# Patient Record
Sex: FEMALE | Race: OTHER
[De-identification: names, ages, dates, MRNs, and addresses within clinical notes are randomized per-mention and may not be internally consistent; named-entity substitution may affect disease eponyms.]

---

## 2020-11-23 ENCOUNTER — HOSPITAL ENCOUNTER (OUTPATIENT)
Dept: HOSPITAL 94 - CARD DIAG | Age: 85
Discharge: HOME | End: 2020-11-23
Attending: INTERNAL MEDICINE
Payer: MEDICARE

## 2020-11-23 DIAGNOSIS — I10: ICD-10-CM

## 2020-11-23 DIAGNOSIS — Z95.2: ICD-10-CM

## 2020-11-23 DIAGNOSIS — Z09: Primary | ICD-10-CM

## 2020-11-23 DIAGNOSIS — I34.0: ICD-10-CM

## 2020-11-23 PROCEDURE — 93005 ELECTROCARDIOGRAM TRACING: CPT

## 2020-11-23 PROCEDURE — 93306 TTE W/DOPPLER COMPLETE: CPT

## 2022-03-15 ENCOUNTER — HOSPITAL ENCOUNTER (EMERGENCY)
Dept: HOSPITAL 94 - ER | Age: 87
Discharge: HOME | End: 2022-03-15
Payer: COMMERCIAL

## 2022-03-15 VITALS — SYSTOLIC BLOOD PRESSURE: 149 MMHG | DIASTOLIC BLOOD PRESSURE: 69 MMHG

## 2022-03-15 VITALS — BODY MASS INDEX: 29.43 KG/M2 | HEIGHT: 60 IN | WEIGHT: 149.91 LBS

## 2022-03-15 DIAGNOSIS — R42: ICD-10-CM

## 2022-03-15 DIAGNOSIS — Z79.82: ICD-10-CM

## 2022-03-15 DIAGNOSIS — I10: Primary | ICD-10-CM

## 2022-03-15 DIAGNOSIS — Z79.899: ICD-10-CM

## 2022-03-15 DIAGNOSIS — Z86.2: ICD-10-CM

## 2022-03-15 DIAGNOSIS — Z98.890: ICD-10-CM

## 2022-03-15 LAB
ALBUMIN SERPL BCP-MCNC: 3.6 G/DL (ref 3.4–5)
ALBUMIN/GLOB SERPL: 1.2 {RATIO} (ref 1.1–1.5)
ALP SERPL-CCNC: 108 IU/L (ref 46–116)
ALT SERPL W P-5'-P-CCNC: 12 U/L (ref 12–78)
ANION GAP SERPL CALCULATED.3IONS-SCNC: 9 MMOL/L (ref 8–16)
AST SERPL W P-5'-P-CCNC: 21 U/L (ref 10–37)
BASOPHILS # BLD AUTO: 0 X10'3 (ref 0–0.2)
BASOPHILS NFR BLD AUTO: 0.4 % (ref 0–1)
BILIRUB SERPL-MCNC: 0.5 MG/DL (ref 0.1–1)
BUN SERPL-MCNC: 12 MG/DL (ref 7–18)
BUN/CREAT SERPL: 15.2 (ref 6.6–38)
CALCIUM SERPL-MCNC: 9.3 MG/DL (ref 8.5–10.1)
CHLORIDE SERPL-SCNC: 98 MMOL/L (ref 99–107)
CO2 SERPL-SCNC: 24.2 MMOL/L (ref 24–32)
CREAT SERPL-MCNC: 0.79 MG/DL (ref 0.4–0.9)
EOSINOPHIL # BLD AUTO: 0 X10'3 (ref 0–0.9)
EOSINOPHIL NFR BLD AUTO: 0 % (ref 0–6)
ERYTHROCYTE [DISTWIDTH] IN BLOOD BY AUTOMATED COUNT: 14.3 % (ref 11.5–14.5)
GFR SERPL CREATININE-BSD FRML MDRD: 69 ML/MIN
GLUCOSE SERPL-MCNC: 108 MG/DL (ref 70–104)
HCT VFR BLD AUTO: 34.1 % (ref 35–45)
HGB BLD-MCNC: 11.6 G/DL (ref 12–16)
LYMPHOCYTES # BLD AUTO: 0.6 X10'3 (ref 1.1–4.8)
LYMPHOCYTES NFR BLD AUTO: 14.2 % (ref 21–51)
MCH RBC QN AUTO: 31.2 PG (ref 27–31)
MCHC RBC AUTO-ENTMCNC: 34 G/DL (ref 33–36.5)
MCV RBC AUTO: 91.6 FL (ref 78–98)
MONOCYTES # BLD AUTO: 0.3 X10'3 (ref 0–0.9)
MONOCYTES NFR BLD AUTO: 7.4 % (ref 2–12)
NEUTROPHILS # BLD AUTO: 3.3 X10'3 (ref 1.8–7.7)
NEUTROPHILS NFR BLD AUTO: 78 % (ref 42–75)
PLATELET # BLD AUTO: 152 X10'3 (ref 140–440)
PMV BLD AUTO: 7.5 FL (ref 7.4–10.4)
POTASSIUM SERPL-SCNC: 3.9 MMOL/L (ref 3.5–5.1)
PROT SERPL-MCNC: 6.6 G/DL (ref 6.4–8.2)
RBC # BLD AUTO: 3.73 X10'6 (ref 4.2–5.6)
SODIUM SERPL-SCNC: 131 MMOL/L (ref 135–145)
WBC # BLD AUTO: 4.3 X10'3 (ref 4.5–11)

## 2022-03-15 PROCEDURE — 83880 ASSAY OF NATRIURETIC PEPTIDE: CPT

## 2022-03-15 PROCEDURE — 36415 COLL VENOUS BLD VENIPUNCTURE: CPT

## 2022-03-15 PROCEDURE — 84484 ASSAY OF TROPONIN QUANT: CPT

## 2022-03-15 PROCEDURE — 71045 X-RAY EXAM CHEST 1 VIEW: CPT

## 2022-03-15 PROCEDURE — 99285 EMERGENCY DEPT VISIT HI MDM: CPT

## 2022-03-15 PROCEDURE — 93005 ELECTROCARDIOGRAM TRACING: CPT

## 2022-03-15 PROCEDURE — 80053 COMPREHEN METABOLIC PANEL: CPT

## 2022-03-15 PROCEDURE — 85025 COMPLETE CBC W/AUTO DIFF WBC: CPT

## 2022-04-11 ENCOUNTER — HOSPITAL ENCOUNTER (EMERGENCY)
Dept: HOSPITAL 94 - ER | Age: 87
Discharge: HOME | End: 2022-04-11
Payer: COMMERCIAL

## 2022-04-11 VITALS — BODY MASS INDEX: 29.83 KG/M2 | WEIGHT: 168.34 LBS | HEIGHT: 63 IN

## 2022-04-11 VITALS — DIASTOLIC BLOOD PRESSURE: 78 MMHG | SYSTOLIC BLOOD PRESSURE: 151 MMHG

## 2022-04-11 DIAGNOSIS — Z79.82: ICD-10-CM

## 2022-04-11 DIAGNOSIS — Z79.899: ICD-10-CM

## 2022-04-11 DIAGNOSIS — Z86.2: ICD-10-CM

## 2022-04-11 DIAGNOSIS — R42: ICD-10-CM

## 2022-04-11 DIAGNOSIS — R33.9: ICD-10-CM

## 2022-04-11 DIAGNOSIS — K59.00: ICD-10-CM

## 2022-04-11 DIAGNOSIS — I10: ICD-10-CM

## 2022-04-11 DIAGNOSIS — I51.7: Primary | ICD-10-CM

## 2022-04-11 LAB
ALBUMIN SERPL BCP-MCNC: 3.5 G/DL (ref 3.4–5)
ALBUMIN/GLOB SERPL: 1.2 {RATIO} (ref 1.1–1.5)
ALP SERPL-CCNC: 120 IU/L (ref 46–116)
ALT SERPL W P-5'-P-CCNC: 14 U/L (ref 12–78)
ANION GAP SERPL CALCULATED.3IONS-SCNC: 6 MMOL/L (ref 8–16)
AST SERPL W P-5'-P-CCNC: 22 U/L (ref 10–37)
BACTERIA URNS QL MICRO: (no result) /HPF
BASOPHILS # BLD AUTO: 0 X10'3 (ref 0–0.2)
BASOPHILS NFR BLD AUTO: 0.7 % (ref 0–1)
BILIRUB SERPL-MCNC: 0.4 MG/DL (ref 0.1–1)
BUN SERPL-MCNC: 13 MG/DL (ref 7–18)
BUN/CREAT SERPL: 14.3 (ref 6.6–38)
CALCIUM SERPL-MCNC: 9.2 MG/DL (ref 8.5–10.1)
CHLORIDE SERPL-SCNC: 102 MMOL/L (ref 99–107)
CLARITY UR: CLEAR
CO2 SERPL-SCNC: 25.8 MMOL/L (ref 24–32)
COLOR UR: YELLOW
CREAT SERPL-MCNC: 0.91 MG/DL (ref 0.4–0.9)
DEPRECATED SQUAMOUS URNS QL MICRO: (no result) /LPF
EOSINOPHIL # BLD AUTO: 0 X10'3 (ref 0–0.9)
EOSINOPHIL NFR BLD AUTO: 0.1 % (ref 0–6)
ERYTHROCYTE [DISTWIDTH] IN BLOOD BY AUTOMATED COUNT: 14 % (ref 11.5–14.5)
GFR SERPL CREATININE-BSD FRML MDRD: 59 ML/MIN
GLUCOSE SERPL-MCNC: 107 MG/DL (ref 70–104)
GLUCOSE UR STRIP-MCNC: NEGATIVE MG/DL
HCT VFR BLD AUTO: 33.1 % (ref 35–45)
HGB BLD-MCNC: 11.4 G/DL (ref 12–16)
HGB UR QL STRIP: (no result)
KETONES UR STRIP-MCNC: NEGATIVE MG/DL
LEUKOCYTE ESTERASE UR QL STRIP: NEGATIVE
LIPASE SERPL-CCNC: < 50 U/L (ref 73–393)
LYMPHOCYTES # BLD AUTO: 0.6 X10'3 (ref 1.1–4.8)
LYMPHOCYTES NFR BLD AUTO: 14.5 % (ref 21–51)
MCH RBC QN AUTO: 31.3 PG (ref 27–31)
MCHC RBC AUTO-ENTMCNC: 34.4 G/DL (ref 33–36.5)
MCV RBC AUTO: 91.1 FL (ref 78–98)
MONOCYTES # BLD AUTO: 0.4 X10'3 (ref 0–0.9)
MONOCYTES NFR BLD AUTO: 8.5 % (ref 2–12)
MUCOUS THREADS URNS QL MICRO: (no result) /LPF
NEUTROPHILS # BLD AUTO: 3.2 X10'3 (ref 1.8–7.7)
NEUTROPHILS NFR BLD AUTO: 76.2 % (ref 42–75)
NITRITE UR QL STRIP: NEGATIVE
PH UR STRIP: 6.5 [PH] (ref 4.8–8)
PLATELET # BLD AUTO: 159 X10'3 (ref 140–440)
PMV BLD AUTO: 7 FL (ref 7.4–10.4)
POTASSIUM SERPL-SCNC: 3.6 MMOL/L (ref 3.5–5.1)
PROT SERPL-MCNC: 6.5 G/DL (ref 6.4–8.2)
PROT UR QL STRIP: NEGATIVE MG/DL
RBC # BLD AUTO: 3.64 X10'6 (ref 4.2–5.6)
SODIUM SERPL-SCNC: 134 MMOL/L (ref 135–145)
SP GR UR STRIP: 1.01 (ref 1–1.03)
URN COLLECT METHOD CLASS: (no result)
UROBILINOGEN UR STRIP-MCNC: 0.2 E.U/DL (ref 0.2–1)
WBC # BLD AUTO: 4.2 X10'3 (ref 4.5–11)
WBC #/AREA URNS HPF: (no result) /HPF (ref 0–4)

## 2022-04-11 PROCEDURE — 93005 ELECTROCARDIOGRAM TRACING: CPT

## 2022-04-11 PROCEDURE — 96360 HYDRATION IV INFUSION INIT: CPT

## 2022-04-11 PROCEDURE — 36415 COLL VENOUS BLD VENIPUNCTURE: CPT

## 2022-04-11 PROCEDURE — 83690 ASSAY OF LIPASE: CPT

## 2022-04-11 PROCEDURE — 80053 COMPREHEN METABOLIC PANEL: CPT

## 2022-04-11 PROCEDURE — 71045 X-RAY EXAM CHEST 1 VIEW: CPT

## 2022-04-11 PROCEDURE — 81001 URINALYSIS AUTO W/SCOPE: CPT

## 2022-04-11 PROCEDURE — 99285 EMERGENCY DEPT VISIT HI MDM: CPT

## 2022-04-11 PROCEDURE — 85025 COMPLETE CBC W/AUTO DIFF WBC: CPT

## 2022-04-11 PROCEDURE — 84484 ASSAY OF TROPONIN QUANT: CPT

## 2022-04-11 NOTE — NUR
Pt and care giver given and understands d/c instructions.  Ambulatory with a 
walker.  IV d/c'd, catheter was intact.

## 2024-12-10 ENCOUNTER — HOSPITAL ENCOUNTER (INPATIENT)
Dept: HOSPITAL 94 - ER | Age: 89
LOS: 2 days | Discharge: HOME HEALTH | DRG: 391 | End: 2024-12-12
Attending: HOSPITALIST | Admitting: SURGERY
Payer: COMMERCIAL

## 2024-12-10 VITALS — WEIGHT: 158.73 LBS | BODY MASS INDEX: 33.32 KG/M2 | HEIGHT: 58 IN

## 2024-12-10 DIAGNOSIS — I12.9: ICD-10-CM

## 2024-12-10 DIAGNOSIS — I95.9: ICD-10-CM

## 2024-12-10 DIAGNOSIS — N18.2: ICD-10-CM

## 2024-12-10 DIAGNOSIS — D64.9: ICD-10-CM

## 2024-12-10 DIAGNOSIS — J44.9: ICD-10-CM

## 2024-12-10 DIAGNOSIS — Z79.82: ICD-10-CM

## 2024-12-10 DIAGNOSIS — E87.1: ICD-10-CM

## 2024-12-10 DIAGNOSIS — I10: ICD-10-CM

## 2024-12-10 DIAGNOSIS — N17.0: ICD-10-CM

## 2024-12-10 DIAGNOSIS — A05.9: Primary | ICD-10-CM

## 2024-12-10 DIAGNOSIS — J44.89: ICD-10-CM

## 2024-12-10 DIAGNOSIS — Z66: ICD-10-CM

## 2024-12-10 DIAGNOSIS — E03.9: ICD-10-CM

## 2024-12-10 DIAGNOSIS — R55: ICD-10-CM

## 2024-12-10 DIAGNOSIS — Z79.899: ICD-10-CM

## 2024-12-10 DIAGNOSIS — E86.0: ICD-10-CM

## 2024-12-10 LAB
ALBUMIN SERPL BCP-MCNC: 3 G/DL (ref 3.4–5)
ALBUMIN/GLOB SERPL: 1 {RATIO} (ref 1.1–1.5)
ALP SERPL-CCNC: 91 IU/L (ref 46–116)
ALT SERPL W P-5'-P-CCNC: 11 U/L (ref 12–78)
ANION GAP SERPL CALCULATED.3IONS-SCNC: 10 MMOL/L (ref 8–16)
AST SERPL W P-5'-P-CCNC: 17 U/L (ref 10–37)
BASOPHILS # BLD AUTO: 0 X10'3 (ref 0–0.2)
BASOPHILS NFR BLD AUTO: 0.3 % (ref 0–1)
BILIRUB SERPL-MCNC: 0.5 MG/DL (ref 0.1–1)
BUN SERPL-MCNC: 16 MG/DL (ref 7–18)
BUN/CREAT SERPL: 10.4 (ref 10–20)
CALCIUM SERPL-MCNC: 8.5 MG/DL (ref 8.5–10.1)
CHLORIDE SERPL-SCNC: 101 MMOL/L (ref 99–107)
CO2 SERPL-SCNC: 21.7 MMOL/L (ref 24–32)
CREAT CL PREDICTED SERPL C-G-VRATE: 17 ML/MIN
CREAT SERPL-MCNC: 1.54 MG/DL (ref 0.4–0.9)
EOSINOPHIL # BLD AUTO: 0 X10'3 (ref 0–0.9)
EOSINOPHIL NFR BLD AUTO: 0 % (ref 0–6)
ERYTHROCYTE [DISTWIDTH] IN BLOOD BY AUTOMATED COUNT: 14.7 % (ref 11.5–14.5)
GFR SERPL CREATININE-BSD FRML MDRD: 32 ML/MIN
GLOBULIN SER CALC-MCNC: 3.1 G/DL (ref 2.7–4.3)
GLUCOSE SERPL-MCNC: 143 MG/DL (ref 70–104)
HCT VFR BLD AUTO: 34.4 % (ref 35–45)
HGB BLD-MCNC: 11.6 G/DL (ref 12–16)
LYMPHOCYTES # BLD AUTO: 0.7 X10'3 (ref 1.1–4.8)
LYMPHOCYTES NFR BLD AUTO: 13 % (ref 21–51)
MCH RBC QN AUTO: 30.4 PG (ref 27–31)
MCHC RBC AUTO-ENTMCNC: 33.7 G/DL (ref 33–36.5)
MCV RBC AUTO: 90.2 FL (ref 78–98)
MONOCYTES # BLD AUTO: 0.3 X10'3 (ref 0–0.9)
MONOCYTES NFR BLD AUTO: 5.3 % (ref 2–12)
NEUTROPHILS # BLD AUTO: 4.5 X10'3 (ref 1.8–7.7)
NEUTROPHILS NFR BLD AUTO: 81.4 % (ref 42–75)
NT-PROBNP SERPL-MCNC: 2342 PG/ML (ref 0–450)
PLATELET # BLD AUTO: 179 X10'3 (ref 140–440)
PMV BLD AUTO: 7.5 FL (ref 7.4–10.4)
POTASSIUM SERPL-SCNC: 4.3 MMOL/L (ref 3.5–5.1)
PROT SERPL-MCNC: 6.1 G/DL (ref 6.4–8.2)
RBC # BLD AUTO: 3.81 X10'6 (ref 4.2–5.6)
SODIUM SERPL-SCNC: 133 MMOL/L (ref 135–145)
WBC # BLD AUTO: 5.6 X10'3 (ref 4.5–11)

## 2024-12-10 PROCEDURE — 99285 EMERGENCY DEPT VISIT HI MDM: CPT

## 2024-12-10 PROCEDURE — 94760 N-INVAS EAR/PLS OXIMETRY 1: CPT

## 2024-12-10 PROCEDURE — 84443 ASSAY THYROID STIM HORMONE: CPT

## 2024-12-10 PROCEDURE — 97161 PT EVAL LOW COMPLEX 20 MIN: CPT

## 2024-12-10 PROCEDURE — 71045 X-RAY EXAM CHEST 1 VIEW: CPT

## 2024-12-10 PROCEDURE — 36415 COLL VENOUS BLD VENIPUNCTURE: CPT

## 2024-12-10 PROCEDURE — 87081 CULTURE SCREEN ONLY: CPT

## 2024-12-10 PROCEDURE — 93005 ELECTROCARDIOGRAM TRACING: CPT

## 2024-12-10 PROCEDURE — 93306 TTE W/DOPPLER COMPLETE: CPT

## 2024-12-10 PROCEDURE — 80053 COMPREHEN METABOLIC PANEL: CPT

## 2024-12-10 PROCEDURE — 84484 ASSAY OF TROPONIN QUANT: CPT

## 2024-12-10 PROCEDURE — 97535 SELF CARE MNGMENT TRAINING: CPT

## 2024-12-10 PROCEDURE — 83605 ASSAY OF LACTIC ACID: CPT

## 2024-12-10 PROCEDURE — 84145 PROCALCITONIN (PCT): CPT

## 2024-12-10 PROCEDURE — 83880 ASSAY OF NATRIURETIC PEPTIDE: CPT

## 2024-12-10 PROCEDURE — 85025 COMPLETE CBC W/AUTO DIFF WBC: CPT

## 2024-12-10 PROCEDURE — 97530 THERAPEUTIC ACTIVITIES: CPT

## 2024-12-10 PROCEDURE — 87040 BLOOD CULTURE FOR BACTERIA: CPT

## 2024-12-10 PROCEDURE — 83735 ASSAY OF MAGNESIUM: CPT

## 2024-12-10 PROCEDURE — 84100 ASSAY OF PHOSPHORUS: CPT

## 2024-12-10 PROCEDURE — 81001 URINALYSIS AUTO W/SCOPE: CPT

## 2024-12-10 RX ADMIN — SODIUM CHLORIDE ONE MLS/HR: 9 INJECTION INTRAMUSCULAR; INTRAVENOUS; SUBCUTANEOUS at 22:19

## 2024-12-10 RX ADMIN — SODIUM CHLORIDE ONE ML: 9 INJECTION INTRAMUSCULAR; INTRAVENOUS; SUBCUTANEOUS at 20:41

## 2024-12-11 VITALS
OXYGEN SATURATION: 99 % | RESPIRATION RATE: 20 BRPM | DIASTOLIC BLOOD PRESSURE: 42 MMHG | TEMPERATURE: 96.9 F | SYSTOLIC BLOOD PRESSURE: 113 MMHG | HEART RATE: 78 BPM

## 2024-12-11 VITALS
DIASTOLIC BLOOD PRESSURE: 95 MMHG | HEART RATE: 114 BPM | SYSTOLIC BLOOD PRESSURE: 188 MMHG | OXYGEN SATURATION: 97 % | RESPIRATION RATE: 23 BRPM

## 2024-12-11 VITALS
DIASTOLIC BLOOD PRESSURE: 55 MMHG | TEMPERATURE: 97.8 F | RESPIRATION RATE: 14 BRPM | HEART RATE: 89 BPM | OXYGEN SATURATION: 93 % | SYSTOLIC BLOOD PRESSURE: 140 MMHG

## 2024-12-11 VITALS
TEMPERATURE: 98.9 F | DIASTOLIC BLOOD PRESSURE: 82 MMHG | HEART RATE: 102 BPM | SYSTOLIC BLOOD PRESSURE: 183 MMHG | RESPIRATION RATE: 16 BRPM | OXYGEN SATURATION: 98 %

## 2024-12-11 VITALS — HEART RATE: 123 BPM | RESPIRATION RATE: 20 BRPM | OXYGEN SATURATION: 97 %

## 2024-12-11 VITALS
TEMPERATURE: 98.1 F | OXYGEN SATURATION: 100 % | DIASTOLIC BLOOD PRESSURE: 58 MMHG | SYSTOLIC BLOOD PRESSURE: 129 MMHG | RESPIRATION RATE: 14 BRPM | HEART RATE: 82 BPM

## 2024-12-11 VITALS — DIASTOLIC BLOOD PRESSURE: 95 MMHG | SYSTOLIC BLOOD PRESSURE: 188 MMHG | HEART RATE: 114 BPM

## 2024-12-11 VITALS — SYSTOLIC BLOOD PRESSURE: 198 MMHG | HEART RATE: 123 BPM | DIASTOLIC BLOOD PRESSURE: 115 MMHG

## 2024-12-11 VITALS — OXYGEN SATURATION: 99 % | RESPIRATION RATE: 20 BRPM

## 2024-12-11 LAB
AMORPH URATE CRY #/AREA URNS HPF: (no result) /[HPF]
BACTERIA URNS QL MICRO: (no result) /HPF
BILIRUB UR QL STRIP: NEGATIVE
CLARITY UR: CLEAR
COLOR UR: YELLOW
DEPRECATED SQUAMOUS URNS QL MICRO: (no result) /LPF
GLUCOSE UR STRIP-MCNC: NEGATIVE MG/DL
HGB UR QL STRIP: (no result)
KETONES UR STRIP-MCNC: NEGATIVE MG/DL
LEUKOCYTE ESTERASE UR QL STRIP: NEGATIVE
NITRITE UR QL STRIP: NEGATIVE
PH UR STRIP: 5.5 [PH] (ref 4.8–8)
PROT UR QL STRIP: NEGATIVE MG/DL
RBC #/AREA URNS HPF: (no result) /HPF (ref 0–2)
SP GR UR STRIP: 1.01 (ref 1–1.03)
TRANS CELLS URNS QL MICRO: (no result) /HPF
URN COLLECT METHOD CLASS: (no result)
UROBILINOGEN UR STRIP-MCNC: 0.2 E.U/DL (ref 0.2–1)
WBC #/AREA URNS HPF: (no result) /HPF (ref 0–4)

## 2024-12-11 RX ADMIN — SODIUM CHLORIDE, SODIUM LACTATE, POTASSIUM CHLORIDE, AND CALCIUM CHLORIDE SCH MLS/HR: .6; .31; .03; .02 INJECTION, SOLUTION INTRAVENOUS at 00:37

## 2024-12-11 RX ADMIN — HEPARIN SODIUM SCH UNIT: 5000 INJECTION, SOLUTION INTRAVENOUS; SUBCUTANEOUS at 08:36

## 2024-12-11 RX ADMIN — LOPERAMIDE HYDROCHLORIDE ONE MG: 2 CAPSULE ORAL at 05:23

## 2024-12-11 RX ADMIN — LEVOTHYROXINE SODIUM SCH MCG: 75 TABLET ORAL at 07:07

## 2024-12-12 VITALS — OXYGEN SATURATION: 96 % | RESPIRATION RATE: 16 BRPM | HEART RATE: 91 BPM

## 2024-12-12 VITALS — DIASTOLIC BLOOD PRESSURE: 79 MMHG | SYSTOLIC BLOOD PRESSURE: 160 MMHG | HEART RATE: 99 BPM

## 2024-12-12 VITALS
OXYGEN SATURATION: 97 % | SYSTOLIC BLOOD PRESSURE: 160 MMHG | HEART RATE: 99 BPM | DIASTOLIC BLOOD PRESSURE: 79 MMHG | RESPIRATION RATE: 16 BRPM | TEMPERATURE: 98.1 F

## 2024-12-12 VITALS
SYSTOLIC BLOOD PRESSURE: 168 MMHG | OXYGEN SATURATION: 99 % | HEART RATE: 105 BPM | TEMPERATURE: 97.6 F | DIASTOLIC BLOOD PRESSURE: 86 MMHG | RESPIRATION RATE: 20 BRPM

## 2024-12-12 LAB
ALBUMIN SERPL BCP-MCNC: 2.8 G/DL (ref 3.4–5)
ALBUMIN/GLOB SERPL: 0.9 {RATIO} (ref 1.1–1.5)
ALP SERPL-CCNC: 83 IU/L (ref 46–116)
ALT SERPL W P-5'-P-CCNC: 14 U/L (ref 12–78)
ANION GAP SERPL CALCULATED.3IONS-SCNC: 7 MMOL/L (ref 8–16)
AST SERPL W P-5'-P-CCNC: 22 U/L (ref 10–37)
BASOPHILS # BLD AUTO: 0 X10'3 (ref 0–0.2)
BASOPHILS NFR BLD AUTO: 0.5 % (ref 0–1)
BILIRUB SERPL-MCNC: 0.6 MG/DL (ref 0.1–1)
BUN SERPL-MCNC: 12 MG/DL (ref 7–18)
BUN/CREAT SERPL: 16.4 (ref 10–20)
CALCIUM SERPL-MCNC: 9.1 MG/DL (ref 8.5–10.1)
CHLORIDE SERPL-SCNC: 103 MMOL/L (ref 99–107)
CO2 SERPL-SCNC: 23.4 MMOL/L (ref 24–32)
CREAT CL PREDICTED SERPL C-G-VRATE: 35 ML/MIN
CREAT SERPL-MCNC: 0.73 MG/DL (ref 0.4–0.9)
EOSINOPHIL # BLD AUTO: 0 X10'3 (ref 0–0.9)
EOSINOPHIL NFR BLD AUTO: 0.2 % (ref 0–6)
ERYTHROCYTE [DISTWIDTH] IN BLOOD BY AUTOMATED COUNT: 14.3 % (ref 11.5–14.5)
GFR SERPL CREATININE-BSD FRML MDRD: 75 ML/MIN
GLOBULIN SER CALC-MCNC: 3 G/DL (ref 2.7–4.3)
GLUCOSE SERPL-MCNC: 96 MG/DL (ref 70–104)
HCT VFR BLD AUTO: 31.2 % (ref 35–45)
HGB BLD-MCNC: 10.8 G/DL (ref 12–16)
LYMPHOCYTES # BLD AUTO: 0.8 X10'3 (ref 1.1–4.8)
LYMPHOCYTES NFR BLD AUTO: 18.1 % (ref 21–51)
MAGNESIUM SERPL-MCNC: 1.8 MG/DL (ref 1.5–2.4)
MCH RBC QN AUTO: 30.9 PG (ref 27–31)
MCHC RBC AUTO-ENTMCNC: 34.7 G/DL (ref 33–36.5)
MCV RBC AUTO: 89 FL (ref 78–98)
MONOCYTES # BLD AUTO: 0.4 X10'3 (ref 0–0.9)
MONOCYTES NFR BLD AUTO: 8.8 % (ref 2–12)
NEUTROPHILS # BLD AUTO: 3.1 X10'3 (ref 1.8–7.7)
NEUTROPHILS NFR BLD AUTO: 72.4 % (ref 42–75)
PHOSPHATE SERPL-MCNC: 3 MG/DL (ref 2.3–4.5)
PLATELET # BLD AUTO: 154 X10'3 (ref 140–440)
PMV BLD AUTO: 7.5 FL (ref 7.4–10.4)
POTASSIUM SERPL-SCNC: 3.9 MMOL/L (ref 3.5–5.1)
PROT SERPL-MCNC: 5.8 G/DL (ref 6.4–8.2)
RBC # BLD AUTO: 3.51 X10'6 (ref 4.2–5.6)
SODIUM SERPL-SCNC: 133 MMOL/L (ref 135–145)
TSH SERPL DL<=0.05 MIU/L-ACNC: 0.03 ULU/ML (ref 0.34–4.5)
WBC # BLD AUTO: 4.3 X10'3 (ref 4.5–11)

## 2024-12-12 RX ADMIN — THERA TABS SCH EACH: TAB at 09:14

## 2024-12-12 RX ADMIN — Medication SCH UNIT: at 09:14

## 2024-12-12 RX ADMIN — FLUTICASONE PROPIONATE PRN SPR: 50 SPRAY, METERED NASAL at 09:16

## 2025-01-05 ENCOUNTER — HOSPITAL ENCOUNTER (INPATIENT)
Dept: HOSPITAL 94 - ER | Age: 89
LOS: 3 days | Discharge: HOME HEALTH | DRG: 640 | End: 2025-01-08
Attending: FAMILY MEDICINE | Admitting: INTERNAL MEDICINE
Payer: COMMERCIAL

## 2025-01-05 VITALS — HEIGHT: 65 IN | BODY MASS INDEX: 28.65 KG/M2 | WEIGHT: 171.96 LBS

## 2025-01-05 DIAGNOSIS — J44.9: ICD-10-CM

## 2025-01-05 DIAGNOSIS — N18.2: ICD-10-CM

## 2025-01-05 DIAGNOSIS — N17.0: ICD-10-CM

## 2025-01-05 DIAGNOSIS — E03.9: ICD-10-CM

## 2025-01-05 DIAGNOSIS — I12.9: ICD-10-CM

## 2025-01-05 DIAGNOSIS — Z79.899: ICD-10-CM

## 2025-01-05 DIAGNOSIS — E86.0: Primary | ICD-10-CM

## 2025-01-05 DIAGNOSIS — K21.9: ICD-10-CM

## 2025-01-05 DIAGNOSIS — D63.8: ICD-10-CM

## 2025-01-05 DIAGNOSIS — Z79.82: ICD-10-CM

## 2025-01-05 DIAGNOSIS — Z66: ICD-10-CM

## 2025-01-05 DIAGNOSIS — E87.1: ICD-10-CM

## 2025-01-05 LAB
ALBUMIN SERPL BCP-MCNC: 3.4 G/DL (ref 3.4–5)
ALBUMIN/GLOB SERPL: 1.1 {RATIO} (ref 1.1–1.5)
ALP SERPL-CCNC: 111 IU/L (ref 46–116)
ALT SERPL W P-5'-P-CCNC: 15 U/L (ref 12–78)
ANION GAP SERPL CALCULATED.3IONS-SCNC: 11 MMOL/L (ref 8–16)
AST SERPL W P-5'-P-CCNC: 19 U/L (ref 10–37)
BASOPHILS # BLD AUTO: 0 X10'3 (ref 0–0.2)
BASOPHILS NFR BLD AUTO: 0.2 % (ref 0–1)
BILIRUB SERPL-MCNC: 0.5 MG/DL (ref 0.1–1)
BUN SERPL-MCNC: 13 MG/DL (ref 7–18)
BUN/CREAT SERPL: 12.5 (ref 10–20)
CALCIUM SERPL-MCNC: 9.1 MG/DL (ref 8.5–10.1)
CHLORIDE SERPL-SCNC: 99 MMOL/L (ref 99–107)
CO2 SERPL-SCNC: 22.3 MMOL/L (ref 24–32)
CREAT CL PREDICTED SERPL C-G-VRATE: 34 ML/MIN
CREAT SERPL-MCNC: 1.04 MG/DL (ref 0.4–0.9)
EOSINOPHIL # BLD AUTO: 0 X10'3 (ref 0–0.9)
EOSINOPHIL NFR BLD AUTO: 0 % (ref 0–6)
ERYTHROCYTE [DISTWIDTH] IN BLOOD BY AUTOMATED COUNT: 15 % (ref 11.5–14.5)
GFR SERPL CREATININE-BSD FRML MDRD: 50 ML/MIN
GLOBULIN SER CALC-MCNC: 3.2 G/DL (ref 2.7–4.3)
GLUCOSE SERPL-MCNC: 141 MG/DL (ref 70–104)
HCT VFR BLD AUTO: 34 % (ref 35–45)
HGB BLD-MCNC: 11.7 G/DL (ref 12–16)
LIPASE SERPL-CCNC: 18 U/L (ref 16–77)
LYMPHOCYTES # BLD AUTO: 0.6 X10'3 (ref 1.1–4.8)
LYMPHOCYTES NFR BLD AUTO: 6.8 % (ref 21–51)
MCH RBC QN AUTO: 30.8 PG (ref 27–31)
MCHC RBC AUTO-ENTMCNC: 34.3 G/DL (ref 33–36.5)
MCV RBC AUTO: 89.8 FL (ref 78–98)
MONOCYTES # BLD AUTO: 0.6 X10'3 (ref 0–0.9)
MONOCYTES NFR BLD AUTO: 7.4 % (ref 2–12)
NEUTROPHILS # BLD AUTO: 7.3 X10'3 (ref 1.8–7.7)
NEUTROPHILS NFR BLD AUTO: 85.6 % (ref 42–75)
PLATELET # BLD AUTO: 176 X10'3 (ref 140–440)
PMV BLD AUTO: 7.5 FL (ref 7.4–10.4)
POTASSIUM SERPL-SCNC: 4 MMOL/L (ref 3.5–5.1)
PROT SERPL-MCNC: 6.6 G/DL (ref 6.4–8.2)
RBC # BLD AUTO: 3.79 X10'6 (ref 4.2–5.6)
SODIUM SERPL-SCNC: 132 MMOL/L (ref 135–145)
WBC # BLD AUTO: 8.6 X10'3 (ref 4.5–11)

## 2025-01-05 PROCEDURE — 97161 PT EVAL LOW COMPLEX 20 MIN: CPT

## 2025-01-05 PROCEDURE — 83550 IRON BINDING TEST: CPT

## 2025-01-05 PROCEDURE — 83880 ASSAY OF NATRIURETIC PEPTIDE: CPT

## 2025-01-05 PROCEDURE — 87045 FECES CULTURE AEROBIC BACT: CPT

## 2025-01-05 PROCEDURE — 87081 CULTURE SCREEN ONLY: CPT

## 2025-01-05 PROCEDURE — 85025 COMPLETE CBC W/AUTO DIFF WBC: CPT

## 2025-01-05 PROCEDURE — 80053 COMPREHEN METABOLIC PANEL: CPT

## 2025-01-05 PROCEDURE — 87449 NOS EACH ORGANISM AG IA: CPT

## 2025-01-05 PROCEDURE — 83735 ASSAY OF MAGNESIUM: CPT

## 2025-01-05 PROCEDURE — 84484 ASSAY OF TROPONIN QUANT: CPT

## 2025-01-05 PROCEDURE — 82728 ASSAY OF FERRITIN: CPT

## 2025-01-05 PROCEDURE — 74176 CT ABD & PELVIS W/O CONTRAST: CPT

## 2025-01-05 PROCEDURE — 99285 EMERGENCY DEPT VISIT HI MDM: CPT

## 2025-01-05 PROCEDURE — 97110 THERAPEUTIC EXERCISES: CPT

## 2025-01-05 PROCEDURE — 83540 ASSAY OF IRON: CPT

## 2025-01-05 PROCEDURE — 97530 THERAPEUTIC ACTIVITIES: CPT

## 2025-01-05 PROCEDURE — 71250 CT THORAX DX C-: CPT

## 2025-01-05 PROCEDURE — 83690 ASSAY OF LIPASE: CPT

## 2025-01-05 PROCEDURE — 87324 CLOSTRIDIUM AG IA: CPT

## 2025-01-05 PROCEDURE — 87046 STOOL CULTR AEROBIC BACT EA: CPT

## 2025-01-05 PROCEDURE — 36415 COLL VENOUS BLD VENIPUNCTURE: CPT

## 2025-01-05 RX ADMIN — SODIUM CHLORIDE ONE MLS/HR: 9 INJECTION INTRAMUSCULAR; INTRAVENOUS; SUBCUTANEOUS at 23:53

## 2025-01-06 VITALS — OXYGEN SATURATION: 97 % | RESPIRATION RATE: 18 BRPM

## 2025-01-06 VITALS
HEART RATE: 98 BPM | OXYGEN SATURATION: 99 % | TEMPERATURE: 99.3 F | RESPIRATION RATE: 16 BRPM | SYSTOLIC BLOOD PRESSURE: 174 MMHG | DIASTOLIC BLOOD PRESSURE: 67 MMHG

## 2025-01-06 VITALS
OXYGEN SATURATION: 98 % | DIASTOLIC BLOOD PRESSURE: 70 MMHG | RESPIRATION RATE: 16 BRPM | TEMPERATURE: 97.5 F | SYSTOLIC BLOOD PRESSURE: 134 MMHG

## 2025-01-06 VITALS
DIASTOLIC BLOOD PRESSURE: 65 MMHG | RESPIRATION RATE: 20 BRPM | OXYGEN SATURATION: 93 % | SYSTOLIC BLOOD PRESSURE: 112 MMHG | TEMPERATURE: 98.2 F | HEART RATE: 72 BPM

## 2025-01-06 VITALS
TEMPERATURE: 98.2 F | OXYGEN SATURATION: 97 % | DIASTOLIC BLOOD PRESSURE: 84 MMHG | HEART RATE: 82 BPM | RESPIRATION RATE: 14 BRPM | SYSTOLIC BLOOD PRESSURE: 132 MMHG

## 2025-01-06 VITALS — RESPIRATION RATE: 16 BRPM

## 2025-01-06 LAB
C DIFF SPECIMEN=DIARRHEA?: (no result)
C DIFF TOX A+B STL QL IA: NEGATIVE
C DIFF TOX A+B STL QL: NEGATIVE
FERRITIN SERPL-MCNC: 497 NG/ML (ref 8–252)
IRON SATN MFR SERPL: 185 UG/DL (ref 259–388)
IRON SATN MFR SERPL: 24 % (ref 11–46)
IRON SERPL-MCNC: 45 UG/DL (ref 49–151)
MAGNESIUM SERPL-MCNC: 2 MG/DL (ref 1.5–2.4)
NT-PROBNP SERPL-MCNC: 1078 PG/ML (ref 0–450)

## 2025-01-06 PROCEDURE — BW251ZZ COMPUTERIZED TOMOGRAPHY (CT SCAN) OF CHEST, ABDOMEN AND PELVIS USING LOW OSMOLAR CONTRAST: ICD-10-PCS

## 2025-01-06 RX ADMIN — DOCUSATE SODIUM SCH MG: 100 CAPSULE, LIQUID FILLED ORAL at 08:00

## 2025-01-06 RX ADMIN — SODIUM CHLORIDE SCH MLS/HR: 9 INJECTION INTRAMUSCULAR; INTRAVENOUS; SUBCUTANEOUS at 04:06

## 2025-01-06 RX ADMIN — HEPARIN SODIUM SCH UNIT: 5000 INJECTION, SOLUTION INTRAVENOUS; SUBCUTANEOUS at 08:38

## 2025-01-07 VITALS
HEART RATE: 100 BPM | OXYGEN SATURATION: 100 % | TEMPERATURE: 97.5 F | RESPIRATION RATE: 16 BRPM | DIASTOLIC BLOOD PRESSURE: 69 MMHG | SYSTOLIC BLOOD PRESSURE: 149 MMHG

## 2025-01-07 VITALS
TEMPERATURE: 97.8 F | RESPIRATION RATE: 22 BRPM | OXYGEN SATURATION: 97 % | HEART RATE: 87 BPM | SYSTOLIC BLOOD PRESSURE: 193 MMHG | DIASTOLIC BLOOD PRESSURE: 79 MMHG

## 2025-01-07 VITALS
DIASTOLIC BLOOD PRESSURE: 61 MMHG | HEART RATE: 102 BPM | TEMPERATURE: 98.3 F | OXYGEN SATURATION: 100 % | SYSTOLIC BLOOD PRESSURE: 147 MMHG | RESPIRATION RATE: 17 BRPM

## 2025-01-07 VITALS
HEART RATE: 110 BPM | TEMPERATURE: 97.6 F | DIASTOLIC BLOOD PRESSURE: 86 MMHG | OXYGEN SATURATION: 99 % | RESPIRATION RATE: 20 BRPM | SYSTOLIC BLOOD PRESSURE: 145 MMHG

## 2025-01-07 VITALS — RESPIRATION RATE: 18 BRPM

## 2025-01-07 LAB
ALBUMIN SERPL BCP-MCNC: 3.1 G/DL (ref 3.4–5)
ALBUMIN/GLOB SERPL: 1 {RATIO} (ref 1.1–1.5)
ALP SERPL-CCNC: 94 IU/L (ref 46–116)
ALT SERPL W P-5'-P-CCNC: 15 U/L (ref 12–78)
ANION GAP SERPL CALCULATED.3IONS-SCNC: 9 MMOL/L (ref 8–16)
AST SERPL W P-5'-P-CCNC: 23 U/L (ref 10–37)
BASOPHILS # BLD AUTO: 0 X10'3 (ref 0–0.2)
BASOPHILS NFR BLD AUTO: 0.4 % (ref 0–1)
BILIRUB SERPL-MCNC: 0.6 MG/DL (ref 0.1–1)
BUN SERPL-MCNC: 7 MG/DL (ref 7–18)
BUN/CREAT SERPL: 10.8 (ref 10–20)
CALCIUM SERPL-MCNC: 9.2 MG/DL (ref 8.5–10.1)
CHLORIDE SERPL-SCNC: 106 MMOL/L (ref 99–107)
CO2 SERPL-SCNC: 23.2 MMOL/L (ref 24–32)
CREAT CL PREDICTED SERPL C-G-VRATE: 55 ML/MIN
CREAT SERPL-MCNC: 0.65 MG/DL (ref 0.4–0.9)
EOSINOPHIL # BLD AUTO: 0 X10'3 (ref 0–0.9)
EOSINOPHIL NFR BLD AUTO: 0.1 % (ref 0–6)
ERYTHROCYTE [DISTWIDTH] IN BLOOD BY AUTOMATED COUNT: 15 % (ref 11.5–14.5)
GFR SERPL CREATININE-BSD FRML MDRD: 86 ML/MIN
GLOBULIN SER CALC-MCNC: 3.2 G/DL (ref 2.7–4.3)
GLUCOSE SERPL-MCNC: 95 MG/DL (ref 70–104)
HCT VFR BLD AUTO: 33.7 % (ref 35–45)
HGB BLD-MCNC: 11.6 G/DL (ref 12–16)
LYMPHOCYTES # BLD AUTO: 1 X10'3 (ref 1.1–4.8)
LYMPHOCYTES NFR BLD AUTO: 27.3 % (ref 21–51)
MAGNESIUM SERPL-MCNC: 2 MG/DL (ref 1.5–2.4)
MCH RBC QN AUTO: 31.1 PG (ref 27–31)
MCHC RBC AUTO-ENTMCNC: 34.3 G/DL (ref 33–36.5)
MCV RBC AUTO: 90.4 FL (ref 78–98)
MONOCYTES # BLD AUTO: 0.3 X10'3 (ref 0–0.9)
MONOCYTES NFR BLD AUTO: 8 % (ref 2–12)
NEUTROPHILS # BLD AUTO: 2.4 X10'3 (ref 1.8–7.7)
NEUTROPHILS NFR BLD AUTO: 64.2 % (ref 42–75)
PLATELET # BLD AUTO: 153 X10'3 (ref 140–440)
PMV BLD AUTO: 7.4 FL (ref 7.4–10.4)
POTASSIUM SERPL-SCNC: 3.9 MMOL/L (ref 3.5–5.1)
PROT SERPL-MCNC: 6.3 G/DL (ref 6.4–8.2)
RBC # BLD AUTO: 3.73 X10'6 (ref 4.2–5.6)
SODIUM SERPL-SCNC: 138 MMOL/L (ref 135–145)
WBC # BLD AUTO: 3.7 X10'3 (ref 4.5–11)

## 2025-01-07 RX ADMIN — HYDRALAZINE HYDROCHLORIDE SCH MG: 20 INJECTION INTRAMUSCULAR; INTRAVENOUS at 14:41

## 2025-01-07 RX ADMIN — HYDRALAZINE HYDROCHLORIDE ONE MG: 20 INJECTION INTRAMUSCULAR; INTRAVENOUS at 09:59

## 2025-01-08 VITALS
DIASTOLIC BLOOD PRESSURE: 70 MMHG | RESPIRATION RATE: 14 BRPM | OXYGEN SATURATION: 99 % | SYSTOLIC BLOOD PRESSURE: 140 MMHG | TEMPERATURE: 97.6 F | HEART RATE: 100 BPM

## 2025-01-08 VITALS
RESPIRATION RATE: 16 BRPM | TEMPERATURE: 97.5 F | HEART RATE: 97 BPM | DIASTOLIC BLOOD PRESSURE: 64 MMHG | OXYGEN SATURATION: 97 % | SYSTOLIC BLOOD PRESSURE: 147 MMHG

## 2025-01-08 LAB
ALBUMIN SERPL BCP-MCNC: 3 G/DL (ref 3.4–5)
ALBUMIN/GLOB SERPL: 0.9 {RATIO} (ref 1.1–1.5)
ALP SERPL-CCNC: 93 IU/L (ref 46–116)
ALT SERPL W P-5'-P-CCNC: 12 U/L (ref 12–78)
ANION GAP SERPL CALCULATED.3IONS-SCNC: 13 MMOL/L (ref 8–16)
AST SERPL W P-5'-P-CCNC: 23 U/L (ref 10–37)
BASOPHILS # BLD AUTO: 0 X10'3 (ref 0–0.2)
BASOPHILS NFR BLD AUTO: 0.5 % (ref 0–1)
BILIRUB SERPL-MCNC: 0.6 MG/DL (ref 0.1–1)
BUN SERPL-MCNC: 8 MG/DL (ref 7–18)
BUN/CREAT SERPL: 13.8 (ref 10–20)
CALCIUM SERPL-MCNC: 9.4 MG/DL (ref 8.5–10.1)
CHLORIDE SERPL-SCNC: 105 MMOL/L (ref 99–107)
CO2 SERPL-SCNC: 19.8 MMOL/L (ref 24–32)
CREAT CL PREDICTED SERPL C-G-VRATE: 61 ML/MIN
CREAT SERPL-MCNC: 0.58 MG/DL (ref 0.4–0.9)
EOSINOPHIL # BLD AUTO: 0 X10'3 (ref 0–0.9)
EOSINOPHIL NFR BLD AUTO: 0.1 % (ref 0–6)
ERYTHROCYTE [DISTWIDTH] IN BLOOD BY AUTOMATED COUNT: 14.8 % (ref 11.5–14.5)
GFR SERPL CREATININE-BSD FRML MDRD: > 90 ML/MIN
GLOBULIN SER CALC-MCNC: 3.2 G/DL (ref 2.7–4.3)
GLUCOSE SERPL-MCNC: 99 MG/DL (ref 70–104)
HCT VFR BLD AUTO: 34.2 % (ref 35–45)
HGB BLD-MCNC: 11.8 G/DL (ref 12–16)
LYMPHOCYTES # BLD AUTO: 1.5 X10'3 (ref 1.1–4.8)
LYMPHOCYTES NFR BLD AUTO: 24.3 % (ref 21–51)
MAGNESIUM SERPL-MCNC: 1.7 MG/DL (ref 1.5–2.4)
MCH RBC QN AUTO: 31 PG (ref 27–31)
MCHC RBC AUTO-ENTMCNC: 34.5 G/DL (ref 33–36.5)
MCV RBC AUTO: 89.9 FL (ref 78–98)
MONOCYTES # BLD AUTO: 0.5 X10'3 (ref 0–0.9)
MONOCYTES NFR BLD AUTO: 7.7 % (ref 2–12)
NEUTROPHILS # BLD AUTO: 4.2 X10'3 (ref 1.8–7.7)
NEUTROPHILS NFR BLD AUTO: 67.4 % (ref 42–75)
PLATELET # BLD AUTO: 183 X10'3 (ref 140–440)
PMV BLD AUTO: 7.1 FL (ref 7.4–10.4)
POTASSIUM SERPL-SCNC: 3.3 MMOL/L (ref 3.5–5.1)
PROT SERPL-MCNC: 6.2 G/DL (ref 6.4–8.2)
RBC # BLD AUTO: 3.81 X10'6 (ref 4.2–5.6)
SODIUM SERPL-SCNC: 138 MMOL/L (ref 135–145)
WBC # BLD AUTO: 6.3 X10'3 (ref 4.5–11)

## 2025-01-08 RX ADMIN — POTASSIUM CHLORIDE PRN MEQ: 1500 TABLET, EXTENDED RELEASE ORAL at 09:42
